# Patient Record
Sex: FEMALE | URBAN - METROPOLITAN AREA
[De-identification: names, ages, dates, MRNs, and addresses within clinical notes are randomized per-mention and may not be internally consistent; named-entity substitution may affect disease eponyms.]

---

## 2019-06-24 ENCOUNTER — OFFICE VISIT (OUTPATIENT)
Dept: URBAN - METROPOLITAN AREA CLINIC 76 | Facility: CLINIC | Age: 69
End: 2019-06-24
Payer: MEDICARE

## 2019-06-24 DIAGNOSIS — H10.45 OTHER CHRONIC ALLERGIC CONJUNCTIVITIS: Primary | ICD-10-CM

## 2019-06-24 PROCEDURE — 92002 INTRM OPH EXAM NEW PATIENT: CPT | Performed by: OPTOMETRIST

## 2019-06-24 ASSESSMENT — INTRAOCULAR PRESSURE
OS: 16
OD: 16

## 2019-06-24 NOTE — IMPRESSION/PLAN
Impression: Other chronic allergic conjunctivitis: H10.45. OU. Instilled 1 drop Bepreve in office OU. Plan: Discussed diagnosis with patient. Recommend OTC oral antihistamine. Start Bepreve BID OU (sample given). Rub excess into lids. Recommend refrigerating drops. Use Refresh Nilo 3's 3-4 times per day OU. Pt returning home to [place] on Thursday.